# Patient Record
Sex: MALE | Race: WHITE
[De-identification: names, ages, dates, MRNs, and addresses within clinical notes are randomized per-mention and may not be internally consistent; named-entity substitution may affect disease eponyms.]

---

## 2019-04-23 NOTE — PREOPHP
DATE OF ADMISSION: 04/24/2019

 

HISTORY OF PRESENT ILLNESS:  This 48-year-old gentleman is going to be admitted for diagnostic arthro
scopy of right knee, examination under general anesthesia, partial medial and lateral meniscectomy, p
ossible ACL repair or reconstruction and removal of the previous remnant of the graft and screw from 
previous surgery, application of Harrison dressing.

 

SOCIAL HISTORY:  He used to smoke and stopped smoking.  Nondrinker.

 

FAMILY HISTORY:  Negative.

 

PAST SURGICAL HISTORY:  Previous surgery in 2003 ACL reconstruction.

 

ALLERGIES:  NO HISTORY OF ALLERGY TO MEDICATION.

 

MEDICATIONS:  

1.  Sulindac.

2.  Baclofen.

3.  Folic acid.

4.  For postop, Bactrim-DS and ibuprofen is given.

 

PHYSICAL EXAMINATION:

VITAL SIGNS:  Height 6 inches 4 feet, weighing 315 pounds.

SKIN:  Within normal limits.

EENT:  PERRLA.

HEAD AND NECK:  Normocephalic.  Trachea midline.  Bilateral symmetrical carotid pulses.  No mass, no 
bruit, no lymphadenopathy.

CARDIOVASCULAR:  Normal sinus rhythm.  S1, S2 normal.  No murmur, no JVD, no peripheral edema.

LUNGS:  Clear.

ABDOMEN:  Protuberant.  No organomegaly.  No mass.  Bowel sounds present.

RECTAL:  Not pertinent to this admission, not done.

MUSCULOSKELETAL:  Head and neck unremarkable.  Upper extremities normal with normal neurovascular exa
mination.  Spine clear.  Both lower extremities symmetrical and normal except for right knee, full ra
nge of motion, was able to _____ the knee 70 to 80 degrees.  There is marked tenderness over the medi
al or lateral tibiofemoral joint line.  Positive Yenni test.  There is mild tenderness over the pa
tellofemoral joint line.

 

RADIOLOGIC EVALUATION:  MRI report indicates postoperative changes with meniscal debridement, partial
 meniscectomy bilaterally and ACL tear.  There is moderate significant arthrosis of the anterior aspe
ct of the knee joint.  ACL repair forward with complete disruption of the graft ossified mass within 
the posterior aspect of the intercondylar notch may be associated with prior ACL repair of a nonunion
 fracture superimposed to posterior horn of lateral meniscus adjacent to the intercondylar notch comp
artment chondromalacia.

 

X-ray has indicated previous anterior cruciate reconstruction moderate compartment osteoarthrosis.

 

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
 graft.

 

Treatment plan, alternatives, risks and benefits discussed.  No guarantee is being made.  If there is
 a grade III chondromalacia in tricompartment area, there is no reason to go ahead and do this; howev
er, if this _____ reasonable shape of the meniscus.  Will try our best to repair this meniscus and sa
ve them and maybe put another ACL in.  If the condition is not met for ACL allograft, will remove the
 screws and bone grafting will be done and then another second surgery would be performed for ACL all
ograft.

 

A formal H and P is supposed to be done by PCP.

 

 

Dictated By: PAMELA LOPEZ/JENNIFER

DD:    04/23/2019 19:56:07

DT:    04/23/2019 21:20:20

Conf#: [id number]

DID#:  [id number]

## 2019-04-24 ENCOUNTER — HOSPITAL ENCOUNTER (OUTPATIENT)
Dept: HOSPITAL 91 - SDS | Age: 49
Discharge: HOME | End: 2019-04-24
Payer: COMMERCIAL

## 2019-04-24 ENCOUNTER — HOSPITAL ENCOUNTER (OUTPATIENT)
Dept: HOSPITAL 10 - SDS | Age: 49
Discharge: HOME | End: 2019-04-24
Attending: ORTHOPAEDIC SURGERY
Payer: COMMERCIAL

## 2019-04-24 VITALS — RESPIRATION RATE: 13 BRPM | DIASTOLIC BLOOD PRESSURE: 85 MMHG | HEART RATE: 84 BPM | SYSTOLIC BLOOD PRESSURE: 131 MMHG

## 2019-04-24 VITALS — HEART RATE: 69 BPM | DIASTOLIC BLOOD PRESSURE: 77 MMHG | SYSTOLIC BLOOD PRESSURE: 130 MMHG | RESPIRATION RATE: 16 BRPM

## 2019-04-24 VITALS — DIASTOLIC BLOOD PRESSURE: 66 MMHG | SYSTOLIC BLOOD PRESSURE: 142 MMHG | RESPIRATION RATE: 17 BRPM | HEART RATE: 86 BPM

## 2019-04-24 VITALS — SYSTOLIC BLOOD PRESSURE: 151 MMHG | RESPIRATION RATE: 11 BRPM | DIASTOLIC BLOOD PRESSURE: 89 MMHG | HEART RATE: 82 BPM

## 2019-04-24 VITALS — RESPIRATION RATE: 12 BRPM | SYSTOLIC BLOOD PRESSURE: 140 MMHG | HEART RATE: 86 BPM | DIASTOLIC BLOOD PRESSURE: 72 MMHG

## 2019-04-24 VITALS — DIASTOLIC BLOOD PRESSURE: 74 MMHG | SYSTOLIC BLOOD PRESSURE: 135 MMHG | RESPIRATION RATE: 13 BRPM | HEART RATE: 84 BPM

## 2019-04-24 VITALS — SYSTOLIC BLOOD PRESSURE: 144 MMHG | HEART RATE: 87 BPM | DIASTOLIC BLOOD PRESSURE: 71 MMHG | RESPIRATION RATE: 15 BRPM

## 2019-04-24 VITALS
WEIGHT: 315 LBS | WEIGHT: 315 LBS | HEIGHT: 76 IN | BODY MASS INDEX: 38.36 KG/M2 | BODY MASS INDEX: 38.36 KG/M2 | BODY MASS INDEX: 38.36 KG/M2 | HEIGHT: 76 IN

## 2019-04-24 VITALS — RESPIRATION RATE: 16 BRPM | DIASTOLIC BLOOD PRESSURE: 70 MMHG | SYSTOLIC BLOOD PRESSURE: 158 MMHG | HEART RATE: 86 BPM

## 2019-04-24 VITALS — SYSTOLIC BLOOD PRESSURE: 154 MMHG | HEART RATE: 80 BPM | RESPIRATION RATE: 12 BRPM | DIASTOLIC BLOOD PRESSURE: 65 MMHG

## 2019-04-24 VITALS — DIASTOLIC BLOOD PRESSURE: 69 MMHG | RESPIRATION RATE: 13 BRPM | HEART RATE: 90 BPM | SYSTOLIC BLOOD PRESSURE: 140 MMHG

## 2019-04-24 VITALS — SYSTOLIC BLOOD PRESSURE: 128 MMHG | RESPIRATION RATE: 18 BRPM | HEART RATE: 89 BPM | DIASTOLIC BLOOD PRESSURE: 68 MMHG

## 2019-04-24 VITALS — HEART RATE: 86 BPM | SYSTOLIC BLOOD PRESSURE: 148 MMHG | RESPIRATION RATE: 14 BRPM | DIASTOLIC BLOOD PRESSURE: 77 MMHG

## 2019-04-24 VITALS — RESPIRATION RATE: 11 BRPM | DIASTOLIC BLOOD PRESSURE: 73 MMHG | HEART RATE: 84 BPM | SYSTOLIC BLOOD PRESSURE: 153 MMHG

## 2019-04-24 DIAGNOSIS — M23.261: ICD-10-CM

## 2019-04-24 DIAGNOSIS — M22.41: ICD-10-CM

## 2019-04-24 DIAGNOSIS — M23.41: ICD-10-CM

## 2019-04-24 DIAGNOSIS — Z87.891: ICD-10-CM

## 2019-04-24 DIAGNOSIS — M23.211: Primary | ICD-10-CM

## 2019-04-24 DIAGNOSIS — M65.861: ICD-10-CM

## 2019-04-24 LAB
ADD MAN DIFF?: NO
ALANINE AMINOTRANSFERASE: 34 IU/L (ref 13–69)
ALBUMIN/GLOBULIN RATIO: 1.23
ALBUMIN: 4.2 G/DL (ref 3.3–4.9)
ALKALINE PHOSPHATASE: 61 IU/L (ref 42–121)
ANION GAP: 10 (ref 5–13)
ASPARTATE AMINO TRANSFERASE: 27 IU/L (ref 15–46)
BASOPHIL #: 0.1 10^3/UL (ref 0–0.1)
BASOPHILS %: 0.8 % (ref 0–2)
BILIRUBIN,DIRECT: 0 MG/DL (ref 0–0.2)
BILIRUBIN,TOTAL: 0.7 MG/DL (ref 0.2–1.3)
BLOOD UREA NITROGEN: 10 MG/DL (ref 7–20)
CALCIUM: 9.2 MG/DL (ref 8.4–10.2)
CARBON DIOXIDE: 24 MMOL/L (ref 21–31)
CHLORIDE: 109 MMOL/L (ref 97–110)
CREATININE: 0.68 MG/DL (ref 0.61–1.24)
EOSINOPHILS #: 0.2 10^3/UL (ref 0–0.5)
EOSINOPHILS %: 3.4 % (ref 0–7)
GLOBULIN: 3.4 G/DL (ref 1.3–3.2)
GLUCOSE: 101 MG/DL (ref 70–220)
HEMATOCRIT: 39.2 % (ref 42–52)
HEMOGLOBIN: 13.5 G/DL (ref 14–18)
IMMATURE GRANS #M: 0.03 10^3/UL (ref 0–0.03)
IMMATURE GRANS % (M): 0.5 % (ref 0–0.43)
INR: 0.96
LYMPHOCYTES #: 2.4 10^3/UL (ref 0.8–2.9)
LYMPHOCYTES %: 36.1 % (ref 15–51)
MEAN CORPUSCULAR HEMOGLOBIN: 30.2 PG (ref 29–33)
MEAN CORPUSCULAR HGB CONC: 34.4 G/DL (ref 32–37)
MEAN CORPUSCULAR VOLUME: 87.7 FL (ref 82–101)
MEAN PLATELET VOLUME: 9.3 FL (ref 7.4–10.4)
MONOCYTE #: 0.6 10^3/UL (ref 0.3–0.9)
MONOCYTES %: 9.1 % (ref 0–11)
NEUTROPHIL #: 3.3 10^3/UL (ref 1.6–7.5)
NEUTROPHILS %: 50.1 % (ref 39–77)
NUCLEATED RED BLOOD CELLS #: 0 10^3/UL (ref 0–0)
NUCLEATED RED BLOOD CELLS%: 0 /100WBC (ref 0–0)
PARTIAL THROMBOPLASTIN TIME: 27.6 SEC (ref 23–35)
PLATELET COUNT: 192 10^3/UL (ref 140–415)
POTASSIUM: 3.7 MMOL/L (ref 3.5–5.1)
PROTIME: 12.9 SEC (ref 11.9–14.9)
PT RATIO: 1
RED BLOOD COUNT: 4.47 10^6/UL (ref 4.7–6.1)
RED CELL DISTRIBUTION WIDTH: 12.1 % (ref 11.5–14.5)
SODIUM: 143 MMOL/L (ref 135–144)
TOTAL PROTEIN: 7.6 G/DL (ref 6.1–8.1)
WHITE BLOOD COUNT: 6.5 10^3/UL (ref 4.8–10.8)

## 2019-04-24 PROCEDURE — 85610 PROTHROMBIN TIME: CPT

## 2019-04-24 PROCEDURE — 85730 THROMBOPLASTIN TIME PARTIAL: CPT

## 2019-04-24 PROCEDURE — 80053 COMPREHEN METABOLIC PANEL: CPT

## 2019-04-24 PROCEDURE — 85025 COMPLETE CBC W/AUTO DIFF WBC: CPT

## 2019-04-24 PROCEDURE — 29880 ARTHRS KNE SRG MNISECTMY M&L: CPT

## 2019-04-24 RX ADMIN — ONDANSETRON HYDROCHLORIDE 1 MG: 2 INJECTION, SOLUTION INTRAMUSCULAR; INTRAVENOUS at 12:27

## 2019-04-24 RX ADMIN — HYDROMORPHONE HYDROCHLORIDE 1 MG: 1 INJECTION, SOLUTION INTRAMUSCULAR; INTRAVENOUS; SUBCUTANEOUS at 13:21

## 2019-04-24 RX ADMIN — HYDROCODONE BITARTRATE AND ACETAMINOPHEN 1 TAB: 5; 325 TABLET ORAL at 12:48

## 2019-04-24 RX ADMIN — EPINEPHRINE 1 MG: 1 INJECTION PARENTERAL at 08:17

## 2019-04-24 RX ADMIN — HYDROMORPHONE HYDROCHLORIDE 1 MG: 2 INJECTION INTRAMUSCULAR; INTRAVENOUS; SUBCUTANEOUS at 12:27

## 2019-04-24 RX ADMIN — MORPHINE SULFATE 1 MG: 1 INJECTION, SOLUTION EPIDURAL; INTRATHECAL; INTRAVENOUS at 11:16

## 2019-04-24 NOTE — PREAC
Date/Time of Note


Date/Time of Note


DATE: 4/24/19 


TIME: 07:06





Anesthesia Eval and Record


Evaluation


Time Pre-Procedure Interview


DATE: 4/24/19 


TIME: 07:06


Age


48


Sex


male


NPO:  8 hrs


Preoperative diagnosis


right knee meniscus tear and ACL rupture


Planned procedure


right knee operative arthroscopy, partial medial and lateral meniscectomy or 


repair, ACL repair





Past Medical History


Past Medical History:  Includes


Cardio:  Dyslipidemia


GI:  Morbid obesity





Surgery & Anesthesia Issues


No known issue





Meds


Anticoagulation:  No


Beta Blocker within 24 hr:  No


Reason Beta Blocker not given:  Pt. not on B-Blocker


Discontinued Reported Medications


Hydrocodone/Acetaminophen (Norco  Tablet) 1 Each Tablet, 1 EACH PO Q8 PRN 


for PAIN, TAB


   4/23/19





Current Medications


Cefazolin Sodium/ Dextrose 50 ml @  100 mls/hr OC IVPB ;  Start 4/24/19 at 


07:30;  Stop 4/24/19 at 07:59


Meds reviewed:  Yes





Allergies


Coded Allergies:  


     No Known Allergy (Unverified , 4/24/19)


Allergies Reviewed:  Yes





Labs/Studies


Labs Reviewed:  Reviewed by anesthesiologist


Result Diagram:  


4/24/19 0612





Laboratory Tests


4/24/19 06:12








Pregnancy test:  N/A





Pre-procedure Exam


Last vitals





Vital Signs


  Date      Temp  Pulse  Resp  B/P (MAP)   Pulse Ox  O2          O2 Flow    FiO2


Time                                                 Delivery    Rate


   4/24/19  98.3     89    18      128/68        95  Room Air


     06:29                           (88)





Airway:  Adequate mouth opening, Adequate thyromental dist


Mallampati:  Mallampati III


Teeth:  Normal


Lung:  Normal


Heart:  Normal





ASA Physical Status


ASA physical status:  3


Emergency:  None





Planned Anesthetic


General/MAC:  ETT (vs. ), LMA


Nerve block:  Femoral (right or adductor)





Planned Pain Management


Single shot nerve block, Parenteral pain med





Pre-operative Attestations


Prior to commencing anesthesia and surgery, the patient was re-evaluated, there 


was verification of:


*The patient's identity


*The results of appropriate recent lab work and preoperative vital signs


*The above evaluation not changing prior to induction


*Anesthetic plan, risk benefits, alternative and complications discussed with 


patient/family; questions answered; patient/family understands, accepts and 


wishes to proceed.











GABRIELLA VENCES MD                Apr 24, 2019 07:08

## 2019-04-24 NOTE — OPR
DATE OF OPERATION:  04/24/2019

 

 

PREOPERATIVE DIAGNOSES:

1.  Status post failed ACL reconstruction.

2.  Torn medial meniscus.

3.  Torn lateral meniscus.

4.  Synovitis, right knee.

 

POSTOPERATIVE DIAGNOSES:

1.  Torn medial meniscus.

2.  Torn lateral meniscus.

3.  Synovitis.

4.  ACL disruption remnant of the ACL and allograft with few loose body intercondylar notch about 1 x
 almost 1 inch.

 

OPERATION PERFORMED:  Diagnostic arthroscopy, examination under general anesthesia, partial medial me
niscectomy, partial lateral meniscectomy, synovectomy, removal of retained loose body, ACL repair, wa
s not amenable to allograft reconstruction.

 

ANESTHESIA:  General.

 

BLEEDING:  10 mL.

 

COMPLICATIONS:  None.

 

DESCRIPTION OF PROCEDURE:  The patient was transferred to the operating room and placed on the table 
in supine position.  General anesthesia was induced.  A 2 grams of Ancef was given IV.  The patient's
 right knee was shaved, prepped and draped in the routine fashion.  Landmarks were marked and through
 2 regular anteromedial and anterolateral parapatellar tendon.  Operative arthroscopy was commenced. 
 Examination of suprapatellar pouch indicated minimal synovitis with possible loose bodies superior p
ole of the medial femoral condyle, but however, it seems to be osteophyte which was attached.  Patell
ofemoral indicated grade III chondromalacial.  Going to medial compartment, there was a flap tear of 
the anterior horn of the medial meniscus and seems to be that patient had prior partial meniscectomy.
  There was synovitis in the medial compartment.  Medial compartment indicates grade III generalized 
chondromalacia.  Going to the intercondylar notch, I noticed that there was a huge loose body on the 
medial side of the lateral femoral condyle, sitting in the back and being loose.  ACL seems to be dis
rupted from the tibial side.  Going to lateral compartment, there was vertical and oblique tear of th
e lateral meniscus.  The meniscus seems to be intact, but however, it shows some evidence of partial 
lateral meniscectomy.  Periphery was intact.  There was generalized grade III chondromalacia in the l
ateral compartment.  There was synovitis also on lateral compartment which was coagulated like the me
dial compartment with the Arthrocare Bovie.  Going to the intercondylar notch, we have the probe pull
ed forward the loose body, but however since posteriorly was attached to the ligament and medially at
tached to remnant of ACL and part of the PCL.  We tried to remove it and took some pieces out, but ho
wever it was not removable and every time we tried to press on it, it will go back to the posterior p
art of the leg.  Therefore, we made a vertical incision through the patella tendon.  We therefore cou
ld pull it forward and we used the abrader to abrade it down slowly and took more than an hour to be 
able to get rid of that.  After removal of that, the medial side of the lateral femoral condyle along
 its posterior attached the ACL was very short and looks like the huge loose piece was from below of 
posterior wall.  Therefore, it was not amenable to allograft ACL reconstruction.  Since over the top 
there were fibers attached and I had some of the remnant of the ACL, I decided to do ACL repair with 
the FiberWire.  Therefore, I made a drill at 55 degrees ____ edge of the PCL and possibly ____ and us
e of FiberWire and I put 2 rows of stitch to remnant of the ACL and passed through the loop and broug
ht it down to medial side of the tibia.  We took it down half inch and I used 4.75 SwiveLock through 
that.  Prior we do that over the abraded bone to bleeding surface on the tibial surface and anterior 
to the tibial spine.  We obtained a negative Lachman which was positive before.  ____ which I did to 
do the tibia and it was closed deep and superficial with 0 Vicryl stitch.  The portal was closed and 
the skin was closed with skin staples.  We put 10 mg Duramorph mixed with 10 mL of injectable saline 
into the knee.  Sterile Harrison dressing was applied.  Procedure was terminated.  General anesthesia wa
s stopped.  The patient was taken to recovery room in good and stable condition.

 

 

Dictated By: PAMELA LOPEZ/JENNIFER

DD:    04/24/2019 12:07:34

DT:    04/24/2019 15:33:57

Conf#: [id number]

DID#:  [id number]

## 2019-04-24 NOTE — PAC
Date/Time of Note


Date/Time of Note


DATE: 4/24/19 


TIME: 12:25





Post-Anesthesia Notes


Post-Anesthesia Note


Last documented vital signs





Vital Signs


  Date      Temp  Pulse  Resp  B/P (MAP)   Pulse Ox  O2          O2 Flow    FiO2


Time                                                 Delivery    Rate


   4/24/19           85    12      131/75        95


     11:48                           (99)


   4/24/19  98.8


     11:46





Activity:  WNL


Respiratory function:  WNL


Cardiovascular function:  WNL


Mental status:  Baseline


Pain reasonably controlled:  Yes


Hydration appropriate:  Yes


Nausea/Vomiting absent:  Yes











GABRIELLA VENCES MD                Apr 24, 2019 12:26

## 2019-04-24 NOTE — SIPON
Date/Time of Note


Date/Time of Note


DATE: 4/24/19 


TIME: 11:43





Operative Report


Preoperative Diagnosis


Torn medial , torn lateral meniscus, and torn ACL with synovitis


Postoperative Diagnosis


The same plus hudge loose body in right knee


Operation/Procedure Performed


Diagnostic scope, EUA, partial medial, lateral meniscotomy, synovectomy, removal


of huge loose body. ACL repair. application of Harrison dressing,


Surgeon


Pamela Fagan MD


First assist


Second scrub


Anesthesia:  general


Estimated blood loss:  0 - 10 ml's


Transfusion Required


   none


Specimen


part of loose body


Grafts/Implants


none


Complications


none











PAMELA FAGAN MD              Apr 24, 2019 11:53